# Patient Record
Sex: MALE | Race: ASIAN | NOT HISPANIC OR LATINO | Employment: FULL TIME | URBAN - METROPOLITAN AREA
[De-identification: names, ages, dates, MRNs, and addresses within clinical notes are randomized per-mention and may not be internally consistent; named-entity substitution may affect disease eponyms.]

---

## 2019-07-15 ENCOUNTER — HOSPITAL ENCOUNTER (EMERGENCY)
Facility: HOSPITAL | Age: 24
Discharge: HOME | End: 2019-07-15
Attending: EMERGENCY MEDICINE
Payer: COMMERCIAL

## 2019-07-15 VITALS
SYSTOLIC BLOOD PRESSURE: 117 MMHG | DIASTOLIC BLOOD PRESSURE: 62 MMHG | HEART RATE: 82 BPM | RESPIRATION RATE: 16 BRPM | TEMPERATURE: 99.2 F | OXYGEN SATURATION: 99 % | WEIGHT: 143.3 LBS
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
## 2019-07-15 DIAGNOSIS — S01.01XA LACERATION OF SCALP, INITIAL ENCOUNTER: ICD-10-CM

## 2019-07-15 PROCEDURE — 12001 RPR S/N/AX/GEN/TRNK 2.5CM/<: CPT | Performed by: PHYSICIAN ASSISTANT

## 2019-07-15 PROCEDURE — 99283 EMERGENCY DEPT VISIT LOW MDM: CPT | Performed by: PHYSICIAN ASSISTANT

## 2019-07-15 PROCEDURE — 99284 EMERGENCY DEPT VISIT MOD MDM: CPT

## 2019-07-15 RX ORDER — ACETAMINOPHEN 325 MG/1
650 TABLET ORAL ONCE
Status: COMPLETED | OUTPATIENT
Start: 2019-07-15 | End: 2019-07-15

## 2019-07-15 RX ORDER — LIDOCAINE HYDROCHLORIDE 10 MG/ML
2 INJECTION, SOLUTION EPIDURAL; INFILTRATION; INTRACAUDAL; PERINEURAL ONCE
Status: COMPLETED | OUTPATIENT
Start: 2019-07-15 | End: 2019-07-15

## 2019-07-15 RX ADMIN — LIDOCAINE HYDROCHLORIDE 2 ML: 10 INJECTION, SOLUTION EPIDURAL; INFILTRATION; INTRACAUDAL; PERINEURAL at 19:40

## 2019-07-15 RX ADMIN — ACETAMINOPHEN 650 MG: 325 TABLET, FILM COATED ORAL at 19:39

## 2019-07-15 NOTE — DISCHARGE INSTRUCTIONS
You can shower as normal tomorrow  Keep the area clean and dry, and keep it protected from the sun  Go to PCP, urgent care, return to ED to have staples removed in 5-7 days  Can take Tylenol or ibuprofen at home as needed for pain  Return to ED if symptoms worsen including increased redness, swelling, pus draining from the wound
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
## 2019-07-16 NOTE — ED NOTES
Pt discharge instructions reviewed by DEE Perez, Pt has no further questions at this time  Pt awake and alert, no signs of acute distress noted  Pt ambulated out of ED with a steady gait       Evon Wynn RN  07/15/19 2018

## 2021-09-02 ENCOUNTER — EMERGENCY (EMERGENCY)
Facility: HOSPITAL | Age: 26
LOS: 1 days | Discharge: HOME | End: 2021-09-02
Attending: EMERGENCY MEDICINE
Payer: MEDICAID

## 2021-09-02 VITALS
WEIGHT: 165.35 LBS | SYSTOLIC BLOOD PRESSURE: 118 MMHG | HEART RATE: 61 BPM | TEMPERATURE: 98 F | OXYGEN SATURATION: 97 % | RESPIRATION RATE: 16 BRPM | DIASTOLIC BLOOD PRESSURE: 71 MMHG | HEIGHT: 68 IN

## 2021-09-02 VITALS
DIASTOLIC BLOOD PRESSURE: 70 MMHG | RESPIRATION RATE: 18 BRPM | SYSTOLIC BLOOD PRESSURE: 116 MMHG | TEMPERATURE: 98 F | HEART RATE: 60 BPM | OXYGEN SATURATION: 98 %

## 2021-09-02 LAB
ALBUMIN SERPL ELPH-MCNC: 4.2 G/DL — SIGNIFICANT CHANGE UP (ref 3.5–5)
ALP SERPL-CCNC: 87 U/L — SIGNIFICANT CHANGE UP (ref 40–120)
ALT FLD-CCNC: 72 U/L DA — HIGH (ref 10–60)
ANION GAP SERPL CALC-SCNC: 3 MMOL/L — LOW (ref 5–17)
AST SERPL-CCNC: 23 U/L — SIGNIFICANT CHANGE UP (ref 10–40)
BASOPHILS # BLD AUTO: 0.04 K/UL — SIGNIFICANT CHANGE UP (ref 0–0.2)
BASOPHILS NFR BLD AUTO: 0.6 % — SIGNIFICANT CHANGE UP (ref 0–2)
BILIRUB SERPL-MCNC: 0.5 MG/DL — SIGNIFICANT CHANGE UP (ref 0.2–1.2)
BUN SERPL-MCNC: 10 MG/DL — SIGNIFICANT CHANGE UP (ref 7–18)
CALCIUM SERPL-MCNC: 9 MG/DL — SIGNIFICANT CHANGE UP (ref 8.4–10.5)
CHLORIDE SERPL-SCNC: 108 MMOL/L — SIGNIFICANT CHANGE UP (ref 96–108)
CK SERPL-CCNC: 76 U/L — SIGNIFICANT CHANGE UP (ref 35–232)
CO2 SERPL-SCNC: 28 MMOL/L — SIGNIFICANT CHANGE UP (ref 22–31)
CREAT SERPL-MCNC: 0.73 MG/DL — SIGNIFICANT CHANGE UP (ref 0.5–1.3)
D DIMER BLD IA.RAPID-MCNC: <150 NG/ML DDU — SIGNIFICANT CHANGE UP
EOSINOPHIL # BLD AUTO: 0.25 K/UL — SIGNIFICANT CHANGE UP (ref 0–0.5)
EOSINOPHIL NFR BLD AUTO: 3.8 % — SIGNIFICANT CHANGE UP (ref 0–6)
GLUCOSE SERPL-MCNC: 92 MG/DL — SIGNIFICANT CHANGE UP (ref 70–99)
HCT VFR BLD CALC: 45.6 % — SIGNIFICANT CHANGE UP (ref 39–50)
HGB BLD-MCNC: 15.4 G/DL — SIGNIFICANT CHANGE UP (ref 13–17)
IMM GRANULOCYTES NFR BLD AUTO: 0.2 % — SIGNIFICANT CHANGE UP (ref 0–1.5)
LYMPHOCYTES # BLD AUTO: 2.24 K/UL — SIGNIFICANT CHANGE UP (ref 1–3.3)
LYMPHOCYTES # BLD AUTO: 34.2 % — SIGNIFICANT CHANGE UP (ref 13–44)
MCHC RBC-ENTMCNC: 27.8 PG — SIGNIFICANT CHANGE UP (ref 27–34)
MCHC RBC-ENTMCNC: 33.8 GM/DL — SIGNIFICANT CHANGE UP (ref 32–36)
MCV RBC AUTO: 82.5 FL — SIGNIFICANT CHANGE UP (ref 80–100)
MONOCYTES # BLD AUTO: 0.49 K/UL — SIGNIFICANT CHANGE UP (ref 0–0.9)
MONOCYTES NFR BLD AUTO: 7.5 % — SIGNIFICANT CHANGE UP (ref 2–14)
NEUTROPHILS # BLD AUTO: 3.52 K/UL — SIGNIFICANT CHANGE UP (ref 1.8–7.4)
NEUTROPHILS NFR BLD AUTO: 53.7 % — SIGNIFICANT CHANGE UP (ref 43–77)
NRBC # BLD: 0 /100 WBCS — SIGNIFICANT CHANGE UP (ref 0–0)
OB PNL STL: NEGATIVE — SIGNIFICANT CHANGE UP
PLATELET # BLD AUTO: 296 K/UL — SIGNIFICANT CHANGE UP (ref 150–400)
POTASSIUM SERPL-MCNC: 4.4 MMOL/L — SIGNIFICANT CHANGE UP (ref 3.5–5.3)
POTASSIUM SERPL-SCNC: 4.4 MMOL/L — SIGNIFICANT CHANGE UP (ref 3.5–5.3)
PROT SERPL-MCNC: 7.7 G/DL — SIGNIFICANT CHANGE UP (ref 6–8.3)
RBC # BLD: 5.53 M/UL — SIGNIFICANT CHANGE UP (ref 4.2–5.8)
RBC # FLD: 11.9 % — SIGNIFICANT CHANGE UP (ref 10.3–14.5)
SODIUM SERPL-SCNC: 139 MMOL/L — SIGNIFICANT CHANGE UP (ref 135–145)
T4 AB SER-ACNC: 9.2 UG/DL — SIGNIFICANT CHANGE UP (ref 4.6–12)
TROPONIN I SERPL-MCNC: <0.015 NG/ML — SIGNIFICANT CHANGE UP (ref 0–0.04)
TSH SERPL-MCNC: 1.66 UU/ML — SIGNIFICANT CHANGE UP (ref 0.34–4.82)
WBC # BLD: 6.55 K/UL — SIGNIFICANT CHANGE UP (ref 3.8–10.5)
WBC # FLD AUTO: 6.55 K/UL — SIGNIFICANT CHANGE UP (ref 3.8–10.5)

## 2021-09-02 PROCEDURE — 85025 COMPLETE CBC W/AUTO DIFF WBC: CPT

## 2021-09-02 PROCEDURE — 36415 COLL VENOUS BLD VENIPUNCTURE: CPT

## 2021-09-02 PROCEDURE — 99283 EMERGENCY DEPT VISIT LOW MDM: CPT

## 2021-09-02 PROCEDURE — 82550 ASSAY OF CK (CPK): CPT

## 2021-09-02 PROCEDURE — 93005 ELECTROCARDIOGRAM TRACING: CPT

## 2021-09-02 PROCEDURE — 84436 ASSAY OF TOTAL THYROXINE: CPT

## 2021-09-02 PROCEDURE — 85379 FIBRIN DEGRADATION QUANT: CPT

## 2021-09-02 PROCEDURE — 80053 COMPREHEN METABOLIC PANEL: CPT

## 2021-09-02 PROCEDURE — 84443 ASSAY THYROID STIM HORMONE: CPT

## 2021-09-02 PROCEDURE — 84484 ASSAY OF TROPONIN QUANT: CPT

## 2021-09-02 PROCEDURE — 99285 EMERGENCY DEPT VISIT HI MDM: CPT

## 2021-09-02 PROCEDURE — 82272 OCCULT BLD FECES 1-3 TESTS: CPT

## 2021-09-02 RX ORDER — SODIUM CHLORIDE 9 MG/ML
1000 INJECTION INTRAMUSCULAR; INTRAVENOUS; SUBCUTANEOUS
Refills: 0 | Status: DISCONTINUED | OUTPATIENT
Start: 2021-09-02 | End: 2021-09-06

## 2021-09-02 RX ADMIN — SODIUM CHLORIDE 150 MILLILITER(S): 9 INJECTION INTRAMUSCULAR; INTRAVENOUS; SUBCUTANEOUS at 15:21

## 2021-09-02 NOTE — ED ADULT NURSE NOTE - OBJECTIVE STATEMENT
Pt AOx4, ambulatory, c/o elevated BP x 1 month at night time after 9pm. Also bloody stools since yesterday. As per pt he has seen 2 different hospitals but they cant find the problem. EKG done, pt placed on cardiac monitor, no signs of distress noted.

## 2021-09-02 NOTE — ED ADULT NURSE NOTE - PAIN: PRESENCE, MLM
Saucerization Excision Additional Text (Leave Blank If You Do Not Want): The margin was drawn around the clinically apparent lesion.  Incisions were then made along these lines, in a tangential fashion, to the appropriate tissue plane and the lesion was extirpated. denies pain/discomfort

## 2021-09-02 NOTE — ED PROVIDER NOTE - CLINICAL SUMMARY MEDICAL DECISION MAKING FREE TEXT BOX
pt a , with atypical CP, tachycardia, red stool mostly 2/2 beet juice, will get labs, TFT, d-dimer, reassess

## 2021-09-02 NOTE — ED PROVIDER NOTE - PATIENT PORTAL LINK FT
You can access the FollowMyHealth Patient Portal offered by [facility] by registering at the following website: [email or web address]. By joining [facility]’s FollowMyHealth portal, you will also be able to view your health information using other applications (apps) compatible with our system.

## 2021-09-02 NOTE — ED PROVIDER NOTE - PROGRESS NOTE DETAILS
pt later admits that he had beet juice 2 days ago pt had stress test last mo. which was neg, advised to f/u with cardiology regarding his palpitations.  Will d/c home

## 2021-09-02 NOTE — ED ADULT TRIAGE NOTE - CHIEF COMPLAINT QUOTE
Blood in stool x 2 days ,reports elevated b/p and heart rate at about 9-10 pm every day x 1 month ,reports having had stress test 3 weeks ago which was normal

## 2021-09-02 NOTE — ED PROVIDER NOTE - OBJECTIVE STATEMENT
25 y.o. male c/o "my blood pressure is high in the evening" for 1 mo., pt's seen in 2 different ED in [place], also in [place], (pt's a ), Pt also saw PMD this Mon., told abn LFT & elevated cholesterol.  Pt claims for past 1 mo., noted -120 upon exertion/walking, on & off Lt sided chest burning, but constant for past 2 days, no SOB, lightheadedness, leg edema, fever, coughing, pt had COVID vaccine.  Pt also noted blood in his stoolx1/day for past 2 days, denies straining.  Pt takes nothing for CP.

## 2022-07-08 NOTE — ED PROVIDER NOTE - MUSCULOSKELETAL NEGATIVE STATEMENT, MLM
no back pain, no gout, no musculoskeletal pain, no neck pain, and no weakness.
color consistent with ethnicity/race